# Patient Record
Sex: FEMALE | Race: WHITE
[De-identification: names, ages, dates, MRNs, and addresses within clinical notes are randomized per-mention and may not be internally consistent; named-entity substitution may affect disease eponyms.]

---

## 2019-01-14 ENCOUNTER — HOSPITAL ENCOUNTER (EMERGENCY)
Dept: HOSPITAL 62 - ER | Age: 39
LOS: 1 days | Discharge: HOME | End: 2019-01-15
Payer: COMMERCIAL

## 2019-01-14 DIAGNOSIS — N83.202: Primary | ICD-10-CM

## 2019-01-14 DIAGNOSIS — R10.2: ICD-10-CM

## 2019-01-14 DIAGNOSIS — N39.0: ICD-10-CM

## 2019-01-14 LAB
ADD MANUAL DIFF: NO
ALBUMIN SERPL-MCNC: 4.7 G/DL (ref 3.5–5)
ALP SERPL-CCNC: 70 U/L (ref 38–126)
ALT SERPL-CCNC: 25 U/L (ref 9–52)
ANION GAP SERPL CALC-SCNC: 9 MMOL/L (ref 5–19)
APPEARANCE UR: (no result)
APTT PPP: YELLOW S
AST SERPL-CCNC: 14 U/L (ref 14–36)
BASOPHILS # BLD AUTO: 0 10^3/UL (ref 0–0.2)
BASOPHILS NFR BLD AUTO: 0.4 % (ref 0–2)
BILIRUB DIRECT SERPL-MCNC: 0.3 MG/DL (ref 0–0.4)
BILIRUB SERPL-MCNC: 0.4 MG/DL (ref 0.2–1.3)
BILIRUB UR QL STRIP: NEGATIVE
BUN SERPL-MCNC: 13 MG/DL (ref 7–20)
CALCIUM: 9.5 MG/DL (ref 8.4–10.2)
CHLORIDE SERPL-SCNC: 104 MMOL/L (ref 98–107)
CO2 SERPL-SCNC: 28 MMOL/L (ref 22–30)
EOSINOPHIL # BLD AUTO: 0.1 10^3/UL (ref 0–0.6)
EOSINOPHIL NFR BLD AUTO: 0.7 % (ref 0–6)
ERYTHROCYTE [DISTWIDTH] IN BLOOD BY AUTOMATED COUNT: 12.1 % (ref 11.5–14)
GLUCOSE SERPL-MCNC: 137 MG/DL (ref 75–110)
GLUCOSE UR STRIP-MCNC: NEGATIVE MG/DL
HCT VFR BLD CALC: 43.2 % (ref 36–47)
HGB BLD-MCNC: 14.8 G/DL (ref 12–15.5)
KETONES UR STRIP-MCNC: 20 MG/DL
LYMPHOCYTES # BLD AUTO: 2.2 10^3/UL (ref 0.5–4.7)
LYMPHOCYTES NFR BLD AUTO: 23.2 % (ref 13–45)
MCH RBC QN AUTO: 32.6 PG (ref 27–33.4)
MCHC RBC AUTO-ENTMCNC: 34.3 G/DL (ref 32–36)
MCV RBC AUTO: 95 FL (ref 80–97)
MONOCYTES # BLD AUTO: 0.5 10^3/UL (ref 0.1–1.4)
MONOCYTES NFR BLD AUTO: 5.7 % (ref 3–13)
NEUTROPHILS # BLD AUTO: 6.7 10^3/UL (ref 1.7–8.2)
NEUTS SEG NFR BLD AUTO: 70 % (ref 42–78)
NITRITE UR QL STRIP: NEGATIVE
PH UR STRIP: 6 [PH] (ref 5–9)
PLATELET # BLD: 213 10^3/UL (ref 150–450)
POTASSIUM SERPL-SCNC: 4.2 MMOL/L (ref 3.6–5)
PROT SERPL-MCNC: 7.3 G/DL (ref 6.3–8.2)
PROT UR STRIP-MCNC: NEGATIVE MG/DL
RBC # BLD AUTO: 4.54 10^6/UL (ref 3.72–5.28)
SODIUM SERPL-SCNC: 140.8 MMOL/L (ref 137–145)
SP GR UR STRIP: 1.02
TOTAL CELLS COUNTED % (AUTO): 100 %
UROBILINOGEN UR-MCNC: NEGATIVE MG/DL (ref ?–2)
WBC # BLD AUTO: 9.6 10^3/UL (ref 4–10.5)

## 2019-01-14 PROCEDURE — 87086 URINE CULTURE/COLONY COUNT: CPT

## 2019-01-14 PROCEDURE — 85025 COMPLETE CBC W/AUTO DIFF WBC: CPT

## 2019-01-14 PROCEDURE — 84703 CHORIONIC GONADOTROPIN ASSAY: CPT

## 2019-01-14 PROCEDURE — 76830 TRANSVAGINAL US NON-OB: CPT

## 2019-01-14 PROCEDURE — 80053 COMPREHEN METABOLIC PANEL: CPT

## 2019-01-14 PROCEDURE — 96372 THER/PROPH/DIAG INJ SC/IM: CPT

## 2019-01-14 PROCEDURE — 36415 COLL VENOUS BLD VENIPUNCTURE: CPT

## 2019-01-14 PROCEDURE — 99284 EMERGENCY DEPT VISIT MOD MDM: CPT

## 2019-01-14 PROCEDURE — 81001 URINALYSIS AUTO W/SCOPE: CPT

## 2019-01-14 NOTE — ER DOCUMENT REPORT
ED Medical Screen (RME)





- General


Chief Complaint: Abdominal Pain


Stated Complaint: STOMACH PAINS


Time Seen by Provider: 01/14/19 20:02


Notes: 





This 38-year-old female patient comes emergency room complaining of onset 

yesterday morning of lower abdomen pelvic pain.  Her last menstrual period was 

11/2/2018, she does have PCO S.  She was seen at [facility] 

emergency room yesterday and had a CT scan that showed a left ovarian cyst with 

some free fluid that was felt to be a partially ruptured ovarian cyst.  She was 

prescribed Ultram and Zofran.  She does not believe this is a cyst and warrants 

further evaluation.  She did have CT scan on 12/27/2018 for comparison.  She 

brought the CT scans and the radiology reports with her.  She states she has had

ruptured cyst before and they were much more painful and this does not feel like

her previous ruptured ovarian cysts.


She states that she has not had an ultrasound of the ovaries done in a long 

time.





I have greeted and performed a rapid initial assessment of this patient.  A 

comprehensive ED assessment and evaluation of the patient, analysis of test 

results and completion of the medical decision making process will be conducted 

by additional ED providers.


TRAVEL OUTSIDE OF THE [place] IN LAST 30 DAYS: No





- Related Data


Allergies/Adverse Reactions: 


                                        





No Known Allergies Allergy (Unverified 01/14/19 19:05)


   











Past Medical History





- Social History


Chew tobacco use (# tins/day): No


Frequency of alcohol use: None


Drug Abuse: None


Renal/ Medical History: Denies: Hx Peritoneal Dialysis





Physical Exam





- Vital signs


Vitals: 





                                        











Temp Pulse Resp BP Pulse Ox


 


 98.6 F   92   16   143/79 H  100 


 


 01/14/19 19:27  01/14/19 19:27  01/14/19 19:27  01/14/19 19:27  01/14/19 19:27














Course





- Vital Signs


Vital signs: 





                                        











Temp Pulse Resp BP Pulse Ox


 


 98.6 F   92   16   143/79 H  100 


 


 01/14/19 19:27  01/14/19 19:27  01/14/19 19:27  01/14/19 19:27  01/14/19 19:27

## 2019-01-14 NOTE — ER DOCUMENT REPORT
ED General





- General


Chief Complaint: Abdominal Pain


Stated Complaint: STOMACH PAINS


Time Seen by Provider: 01/14/19 20:02


Notes: 





Patient is a 38-year-old female that presents to the emergency department for 

chief complaint of suprapubic abdominal pain.  Patient states that she started 

having pain a few days ago, that is been constant over the period of time she 

currently describes it as a 6 out of 10 as an aching sensation and cramping in 

nature.  She is had pain like this in the past, she does have a history of 

ovarian cysts, but seems like it is a little bit worse this time, does not one 

side of the other.  She denies having any dysuria, hematuria, urinary frequency 

or hesitancy, and states it does not feel like a UTI.  She denies any any 

fevers, chills, night sweats, had some nausea but no vomiting.  Denies any 

associated diarrhea.  She states that her last menstrual period was 

approximately 2 months ago, stating that she is usually regular.  She also 

denies any abnormal vaginal bleeding or discharge.





Past Medical History: Sciatica, PCO S


Past Surgical History: Denies surgical history


Social History: Denies tobacco, alcohol or drug use


Family History: Reviewed and noncontributory for presenting illness


Allergies: Reviewed, see documented allergy list. 





REVIEW OF SYSTEMS:


Other than noted above, the 12 point review of systems was reviewed with the 

patient and were negative, all pertinent findings are included in the HPI.





PHYSICAL EXAMINATION:





Vital signs reviewed, nursing noted reviewed. 





GENERAL: Well-appearing, well-nourished and in no acute distress.





HEAD: Atraumatic, normocephalic.





EYES: Eyes appear normal, extraocular movements intact, sclera anicteric, 

conjunctiva are normal.





ENT: nares patent, oropharynx clear without exudates.  Moist mucous membranes.





NECK: Normal range of motion, supple without lymphadenopathy





LUNGS: Breath sounds clear to auscultation bilaterally and equal.  No wheezes ra

les or rhonchi.





HEART: Regular rate and rhythm without murmurs





ABDOMEN: Soft, suprapubic tenderness to palpation, normoactive bowel sounds.  No

rebound, guarding, or rigidity. No masses appreciated.





EXTREMITIES: Nontender, good range of motion, no pitting or edema.  





NEUROLOGICAL: No focal neurological deficits. Moves all extremities 

spontaneously Motor and sensory grossly intact on exam.





PSYCH: Normal mood, normal affect.





SKIN: Warm, Dry, normal turgor, no rashes or lesions noted on exposed skin





TRAVEL OUTSIDE OF THE [place] IN LAST 30 DAYS: No





- Related Data


Allergies/Adverse Reactions: 


                                        





No Known Allergies Allergy (Unverified 01/14/19 19:05)


   











Past Medical History





- Social History


Smoking Status: Never Smoker


Chew tobacco use (# tins/day): No


Frequency of alcohol use: None


Drug Abuse: None


Family History: Reviewed & Not Pertinent


Patient has suicidal ideation: No


Patient has homicidal ideation: No


Renal/ Medical History: Denies: Hx Peritoneal Dialysis





Physical Exam





- Vital signs


Vitals: 


                                        











Temp Pulse Resp BP Pulse Ox


 


 98.6 F   92   16   143/79 H  100 


 


 01/14/19 19:27  01/14/19 19:27  01/14/19 19:27  01/14/19 19:27  01/14/19 19:27














Course





- Re-evaluation


Re-evalutation: 





Patient seen and examined vital signs reviewed. 





Laboratory data and imaging were ordered as appropriate for the patient's 

presenting symptoms and complaint, with consideration of any critical or life 

threatening conditions that may be associated with their obtained history and 

exam as noted above.





Patient was treated with IM Toradol 60 mg





Results were reviewed when available and demonstrated unremarkable blood work, 

UA did have positive leuk esterase, and white cells, concerning for possible 

urinary tract infection, transvaginal ultrasound was also obtained due to the 

pelvic pain.  Preliminary report, demonstrated a left moderately sized ovarian 

cyst





The patient was re-evaluated and was stable, and wanting to be discharged, 

formal ultrasound was not back, but preliminary report was, patient did not want

to wait for formal report, if there is a significant finding, I will call the 

patient and advised her of these results otherwise, she was agreeable to this 

plan of care, will prescribe her Keflex twice daily for 3 days for UTI, urine 

sent for culture, and advised to take Motrin at home for the ovarian cyst pain, 

she is advised to follow-up with an OB/GYN, she is given referral.





Evaluation was most consistent with UTI and ovarian cyst.





Results were discussed with the patient at this point, after careful 

consideration I feel that that patient can be discharged from the emergency 

department, the patient was educated treatments and reasons to return to the 

emergency department based on their presumed diagnosis as noted above, they were

advised to followup with a primary care physician in 2-3 days. Patient was 

agreeable to plan of care.





*Note is created using voice recognition software and may contain spelling, 

syntax or grammatical errors.








Laboratory











  01/14/19 01/14/19 01/14/19





  20:21 20:21 20:21


 


WBC  9.6  


 


RBC  4.54  


 


Hgb  14.8  


 


Hct  43.2  


 


MCV  95  


 


MCH  32.6  


 


MCHC  34.3  


 


RDW  12.1  


 


Plt Count  213  


 


Seg Neutrophils %  70.0  


 


Lymphocytes %  23.2  


 


Monocytes %  5.7  


 


Eosinophils %  0.7  


 


Basophils %  0.4  


 


Absolute Neutrophils  6.7  


 


Absolute Lymphocytes  2.2  


 


Absolute Monocytes  0.5  


 


Absolute Eosinophils  0.1  


 


Absolute Basophils  0.0  


 


Sodium   140.8 


 


Potassium   4.2 


 


Chloride   104 


 


Carbon Dioxide   28 


 


Anion Gap   9 


 


BUN   13 


 


Creatinine   0.71 


 


Est GFR ( Amer)   > 60 


 


Est GFR (Non-Af Amer)   > 60 


 


Glucose   137 H 


 


Calcium   9.5 


 


Total Bilirubin   0.4 


 


Direct Bilirubin   0.3 


 


Neonat Total Bilirubin   Not Reportable 


 


Neonat Direct Bilirubin   Not Reportable 


 


Neonat Indirect Bili   Not Reportable 


 


AST   14 


 


ALT   25 


 


Alkaline Phosphatase   70 


 


Total Protein   7.3 


 


Albumin   4.7 


 


Serum HCG, Qual    NEGATIVE


 


Urine Color   


 


Urine Appearance   


 


Urine pH   


 


Ur Specific Gravity   


 


Urine Protein   


 


Urine Glucose (UA)   


 


Urine Ketones   


 


Urine Blood   


 


Urine Nitrite   


 


Urine Bilirubin   


 


Urine Urobilinogen   


 


Ur Leukocyte Esterase   


 


Urine WBC (Auto)   


 


Urine RBC (Auto)   


 


Urine Bacteria (Auto)   


 


Squamous Epi Cells Auto   


 


Urine Mucus (Auto)   


 


Urine Ascorbic Acid   














  01/14/19





  20:21


 


WBC 


 


RBC 


 


Hgb 


 


Hct 


 


MCV 


 


MCH 


 


MCHC 


 


RDW 


 


Plt Count 


 


Seg Neutrophils % 


 


Lymphocytes % 


 


Monocytes % 


 


Eosinophils % 


 


Basophils % 


 


Absolute Neutrophils 


 


Absolute Lymphocytes 


 


Absolute Monocytes 


 


Absolute Eosinophils 


 


Absolute Basophils 


 


Sodium 


 


Potassium 


 


Chloride 


 


Carbon Dioxide 


 


Anion Gap 


 


BUN 


 


Creatinine 


 


Est GFR ( Amer) 


 


Est GFR (Non-Af Amer) 


 


Glucose 


 


Calcium 


 


Total Bilirubin 


 


Direct Bilirubin 


 


Neonat Total Bilirubin 


 


Neonat Direct Bilirubin 


 


Neonat Indirect Bili 


 


AST 


 


ALT 


 


Alkaline Phosphatase 


 


Total Protein 


 


Albumin 


 


Serum HCG, Qual 


 


Urine Color  YELLOW


 


Urine Appearance  SLIGHTLY-CLOUDY


 


Urine pH  6.0


 


Ur Specific Gravity  1.020


 


Urine Protein  NEGATIVE


 


Urine Glucose (UA)  NEGATIVE


 


Urine Ketones  20 H


 


Urine Blood  NEGATIVE


 


Urine Nitrite  NEGATIVE


 


Urine Bilirubin  NEGATIVE


 


Urine Urobilinogen  NEGATIVE


 


Ur Leukocyte Esterase  LARGE H


 


Urine WBC (Auto)  34


 


Urine RBC (Auto)  4


 


Urine Bacteria (Auto)  TRACE


 


Squamous Epi Cells Auto  8


 


Urine Mucus (Auto)  OCC


 


Urine Ascorbic Acid  NEGATIVE

















- Vital Signs


Vital signs: 


                                        











Temp Pulse Resp BP Pulse Ox


 


 98.6 F   92   16   143/79 H  100 


 


 01/14/19 19:27  01/14/19 19:27  01/14/19 19:27  01/14/19 19:27  01/14/19 19:27














- Laboratory


Result Diagrams: 


                                 01/14/19 20:21





                                 01/14/19 20:21


Laboratory results interpreted by me: 


                                        











  01/14/19 01/14/19





  20:21 20:21


 


Glucose  137 H 


 


Urine Ketones   20 H


 


Ur Leukocyte Esterase   LARGE H














Discharge





- Discharge


Clinical Impression: 


Ovarian cyst


Qualifiers:


 Laterality: left Qualified Code(s): N83.202 - Unspecified ovarian cyst, left 

side





UTI (urinary tract infection)


Qualifiers:


 Urinary tract infection type: site unspecified Hematuria presence: without 

hematuria Qualified Code(s): N39.0 - Urinary tract infection, site not specified





Condition: Stable


Disposition: HOME, SELF-CARE


Instructions:  Ovarian Cyst ([facility]), Urinary Tract Infection ([facility])


Prescriptions: 


Cephalexin Monohydrate [Keflex 500 mg Capsule] 500 mg PO BID 5 Days #6 capsule


Referrals: 


[facility] [Provider Group] - Follow up in 3-5 days

## 2019-01-15 VITALS — SYSTOLIC BLOOD PRESSURE: 131 MMHG | DIASTOLIC BLOOD PRESSURE: 88 MMHG

## 2019-01-15 NOTE — RADIOLOGY REPORT (SQ)
EXAM DESCRIPTION: 



US TRANSVAGINAL



COMPLETED DATE/TME:  01/14/2019 20:10



CLINICAL HISTORY: 



38 years, Female, Pelvic pain, PCOS



COMPARISON:

None.



TECHNIQUE:

Transvaginal pelvic ultrasound



LIMITATIONS:

None.



FINDINGS:



The uterus measures 8.0 x 4.3 x 5.0 cm. The endometrium measures

1.3 cm in thickness. The cervix measures 2.3 cm in length.

The right ovary measures 2.2 x 2.0 x 2.2 cm. There is normal

dopplerable flow. There are no abnormal adnexal masses.

The left ovary measures 4.4 x 2.3 x 3.3 cm. There is a 2.4 x 1.9

x 2.5 cm complex cyst, likely representing a hemorrhagic cyst. No

follow-up imaging recommended. There is normal dopplerable flow.

There is no free fluid.



IMPRESSION:



No evidence of PCOS. No evidence of ovarian torsion.

 



copyright 2011 [employer]- All Rights Reserved